# Patient Record
Sex: MALE | Race: WHITE
[De-identification: names, ages, dates, MRNs, and addresses within clinical notes are randomized per-mention and may not be internally consistent; named-entity substitution may affect disease eponyms.]

---

## 2018-04-26 ENCOUNTER — HOSPITAL ENCOUNTER (EMERGENCY)
Dept: HOSPITAL 80 - FED | Age: 28
Discharge: HOME | End: 2018-04-26
Payer: MEDICAID

## 2018-04-26 VITALS — SYSTOLIC BLOOD PRESSURE: 128 MMHG | DIASTOLIC BLOOD PRESSURE: 73 MMHG

## 2018-04-26 DIAGNOSIS — R09.82: ICD-10-CM

## 2018-04-26 DIAGNOSIS — J45.909: Primary | ICD-10-CM

## 2018-04-26 NOTE — EDPHY
HPI/HX/ROS/PE/MDM


Narrative: 


CHIEF COMPLAINT:  Shortness of breath, contsricting throat





HISTORY OF PRESENT ILLNESS: 


The patient is a 26 y/o male complaining of shortness of breath and a 

constricting throat onset several weeks ago. The symptoms are worse in the 

morning but tend to alleviate after a hot shower or when placing his arms above 

his head. His symptoms are aggravated while lying flat. He recently started 

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 

had a cough, congested nose, and radiating burning sensation in his chest since 

starting this job. These symptoms are not similar to prior post nasal drip or 

GERD episodes. Last night his symptoms aggravated and he currently feeling like 

his upper throat is constricting and in pain. He is able to eat and drink 

normally. He feels like every breath is difficult to take. Denies history of 

asthma, pancreatitis, liver, or kidney problems.





No fever, chills, chest pain, palpitations, vomiting, diarrhea, urinary 

complaints, headache, lightheadedness. 





REVIEW OF SYSTEMS:


Aside from elements discussed in the HPI, a comprehensive 10-point review of 

systems was reviewed and is negative.





PAST MEDICAL HISTORY:  Left hand fracture, GERD 





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 

uses alcohol and marijuana, denies illicit drug use





VITAL SIGNS: Reviewed by me


GENERAL: Well-developed, well-nourished, and continually taking deep breaths as 

he feels like he has to.


HEENT: Atraumatic. Eyes: No icterus, no injection. Mouth: moist mucous 

membranes. Faint white discharge in back of oropharynx  No erythema or lesions. 

Neck: supple with no adenopathy.


LUNGS: Right upper lobe wheezes otherwise clear to auscultation, rhonchi or 

rales.


CARDIAC: Regular rate and rhythm, no rubs, murmurs or gallops.


ABDOMEN: Soft, nontender, nondistended, bowel sounds normal.


BACK:  No CVA tenderness.


EXTREMITIES: No trauma. No edema.  Range of motion is normal throughout.


NEURO: Alert and oriented,  grossly nonfocal.  


SKIN: Warm and dry, no rash.


PSYCHIATRIC: Normal mentation, no agitation.





Portions of this note were transcribed by a medical scribe.  I personally 

performed a history, physical exam, medical decision making, and confirmed 

accuracy of information the transcribed note.





ED Course: 


The patient is a 26 y/o male presenting with shortness of breath and a 

constricting throat onset several weeks ago. He is currently continually taking 

deep breaths as he feels like he has to. On exam he has right upper wheezes and 

faint white discharge in the back of his oropharynx. He does not have cervical 

adenopathy. Chest x-ray, soft tissue neck x-ray, and strep screen ordered. 

Albuterol given.





1417: Patient has a negative strep test, chest x-ray and soft tissue cervical.





1429: Reassessed patient, he is feeling better after the Albuterol, he will 

receive a prescription for this. I have prescribed him Prilosec as well, his 

first dose will be given in the emergency department. I discussed the imaging 

results and referred him to an ENT for an outpatient followup. Return 

precautions provided; patient is comfortable with this plan.





MDM: 





Diff dx considered included strep pharyngitis, croup, bacterial tracheitis, 

epiglottis, esophagitits, reactive airways disease, reflux.





- Data Points


Imaging Results: 





Soft tissue neck:


Impression: Unremarkable soft tissue neck. Dictated By: Abhilash Fox MD 





CXR:


IMPRESSION: Normal chest x-ray. Dictated By: Abhilash Fox MD 


Imaging: Discussed imaging studies w/ On call Radiologist, I viewed and 

interpreted images myself


Medications Given: 


 








Discontinued Medications





Albuterol (Proventil Neb)  3 ml IH EDNOW ONE


   Stop: 04/26/18 13:24


   Last Admin: 04/26/18 13:25 Dose:  3 ml








General


Time Seen by Provider: 04/26/18 12:56


Initial Vital Signs: 


 Initial Vital Signs











Temperature (C)  36.5 C   04/26/18 12:16


 


Heart Rate  72   04/26/18 12:16


 


Respiratory Rate  16   04/26/18 12:16


 


Blood Pressure  99/66 L  04/26/18 12:16


 


O2 Sat (%)  97   04/26/18 12:16








 











O2 Delivery Mode               Room Air














Allergies/Adverse Reactions: 


 





No Known Allergies Allergy (Unverified 04/26/18 12:16)


 








Home Medications: 














 Medication  Instructions  Recorded


 


Albuterol [Proventil Inhaler HFA 1 - 2 puffs IH Q4H #1 mdi 04/26/18





(*)]  


 


Loratadine [Claritin] 10 mg PO DAILY #30 tablet 04/26/18


 


Omeprazole 20 mg PO DAILY #30 capsule. 04/26/18














Departure





- Departure


Disposition: Home, Routine, Self-Care


Clinical Impression: 


 Post-nasal drip, Reactive airway disease





Condition: Good


Instructions:  Reactive Airways Disease (ED), Postnasal Drip (DC)


Additional Instructions: 


Take regular doses of Allegra or Claritin, these should not make you drowsy and 

is available over the counter. Do not take a decongestant.





Take Prilosec as prescribed.





Use the albuterol inhaler as prescribed.





Follow up with an ENT in the next week.





Mainstay of therapy will be to drink plenty of fluids, control your symptoms 

with over-the-counter medications, and get plenty of rest.





Return to the emergency department or seek care urgently if you're symptoms are 

worsening despite the above treatment, if you develop shortness of breath, if 

you're unable to drink fluids secondary to throat pain or other issues, if you 

developed, vomiting, diarrhea, or other concerns.


Referrals: 


Akbar Banks MD [Primary Care Provider] - As per Instructions


PEARL Corona MD [Medical Doctor] - As per Instructions


Prescriptions: 


Albuterol [Proventil Inhaler HFA (*)] 1 - 2 puffs IH Q4H #1 mdi


Loratadine [Claritin] 10 mg PO DAILY #30 tablet


Omeprazole 20 mg PO DAILY #30 capsule.


Report Scribed for: Jessica Goldstein


Report Scribed by: Corazon Mike


Date of Report: 04/26/18


Time of Report: 12:57